# Patient Record
(demographics unavailable — no encounter records)

---

## 2019-06-18 NOTE — ULT
EXAM: US Gallbladder RUQ



CLINICAL HISTORY: Gallstone.



COMPARISON: None.                  



FINDINGS:



Pancreas:  Head and proximal body have a normal echotexture. The remainder the pancreas is obscured b
y bowel gas



Liver:Normal echotexture. No hepatic masses or intrahepatic biliary dilatation. The contour of the he
patic margin is maintained. Right hepatic lobe measures 15.6 cm.



Portal vein: Main portal vein is patent. Appropriate directional flow



Gallbladder: No sonographic evidence of cholelithiasis, gallbladder wall thickening or pericholecysti
c fluid.

Francisco's sign:Negative



Bile ducts: 0.2 cm diameter for the common bile duct





Right kidney: No hydronephrosis Right kidney measuring 10.2 x 4.1 x 4.7 cm. cm in length.





IMPRESSION:



Unremarkable exam.



Reported By: Talha Heath 

Electronically Signed:  6/18/2019 8:28 AM